# Patient Record
Sex: FEMALE | Race: WHITE | NOT HISPANIC OR LATINO | Employment: UNEMPLOYED | URBAN - NONMETROPOLITAN AREA
[De-identification: names, ages, dates, MRNs, and addresses within clinical notes are randomized per-mention and may not be internally consistent; named-entity substitution may affect disease eponyms.]

---

## 2022-06-01 ENCOUNTER — LAB (OUTPATIENT)
Dept: LAB | Facility: HOSPITAL | Age: 22
End: 2022-06-01

## 2022-06-01 ENCOUNTER — OFFICE VISIT (OUTPATIENT)
Dept: OBSTETRICS AND GYNECOLOGY | Facility: CLINIC | Age: 22
End: 2022-06-01

## 2022-06-01 VITALS
SYSTOLIC BLOOD PRESSURE: 110 MMHG | WEIGHT: 147 LBS | HEIGHT: 63 IN | DIASTOLIC BLOOD PRESSURE: 62 MMHG | BODY MASS INDEX: 26.05 KG/M2

## 2022-06-01 DIAGNOSIS — Z11.3 SCREEN FOR STD (SEXUALLY TRANSMITTED DISEASE): ICD-10-CM

## 2022-06-01 DIAGNOSIS — N63.10 PAINFUL LUMPY RIGHT BREAST: ICD-10-CM

## 2022-06-01 DIAGNOSIS — Z32.01 POSITIVE PREGNANCY TEST: ICD-10-CM

## 2022-06-01 DIAGNOSIS — N64.4 PAIN OF RIGHT BREAST: ICD-10-CM

## 2022-06-01 DIAGNOSIS — N64.4 PAINFUL LUMPY RIGHT BREAST: ICD-10-CM

## 2022-06-01 DIAGNOSIS — R10.2 PELVIC PAIN: Primary | ICD-10-CM

## 2022-06-01 PROCEDURE — 81025 URINE PREGNANCY TEST: CPT | Performed by: NURSE PRACTITIONER

## 2022-06-01 PROCEDURE — 87661 TRICHOMONAS VAGINALIS AMPLIF: CPT | Performed by: NURSE PRACTITIONER

## 2022-06-01 PROCEDURE — 87591 N.GONORRHOEAE DNA AMP PROB: CPT | Performed by: NURSE PRACTITIONER

## 2022-06-01 PROCEDURE — 87491 CHLMYD TRACH DNA AMP PROBE: CPT | Performed by: NURSE PRACTITIONER

## 2022-06-01 PROCEDURE — 99203 OFFICE O/P NEW LOW 30 MIN: CPT | Performed by: NURSE PRACTITIONER

## 2022-06-01 NOTE — PROGRESS NOTES
Subjective   Saira Sterling is a 21 y.o. right breast issues, inconclusive pregnancy test, pelvic pain    LMP: 05/09/2022  Pap: WNL, 03/17/2022  BC: none    Pt presents with complaints of pain and lump of right breast.  Her breast pain first began around 3 weeks ago, it is worse in the morning.  This morning her and partner palpated a golf ball sized lump behind right nipple.  She denies any nipple or skin color changes.  She breast fed her daughter for a few months back in 2019.  She also reports recurrent pelvic pain which began a few months ago.  She had Kyleena IUD for around 3 years before having it removed in March.  The provider who removed Kyleena had difficulty removing Kyleena IUD and believed it may have been slightly embedded into her uterus.  The pelvic pain is described as throbbing and worse with position changes.  She also reports one positive home pregnancy test followed by a couple of negative pregnancy tests.  Her urine pregnancy test today was negative.      Breast Problem  This is a new problem. The current episode started 1 to 4 weeks ago. The problem occurs daily. The problem has been gradually worsening. Pertinent negatives include no abdominal pain, anorexia, arthralgias, change in bowel habit, chest pain, chills, congestion, coughing, diaphoresis, fatigue, fever, headaches, joint swelling, myalgias, nausea, neck pain, numbness, rash, sore throat, swollen glands, urinary symptoms, vertigo, visual change, vomiting or weakness. Nothing aggravates the symptoms. She has tried nothing for the symptoms.   Pelvic Pain  The patient's primary symptoms include pelvic pain. The patient's pertinent negatives include no genital itching, genital lesions, genital odor, genital rash, missed menses, vaginal bleeding or vaginal discharge. Pertinent negatives include no abdominal pain, anorexia, chills, constipation, diarrhea, dysuria, fever, flank pain, frequency, headaches, hematuria, nausea, rash, sore  throat, urgency or vomiting. The symptoms are aggravated by activity. She has tried nothing for the symptoms. She is sexually active. No, her partner does not have an STD. She uses nothing for contraception. Her past medical history is significant for an STD.       The following portions of the patient's history were reviewed and updated as appropriate: allergies, current medications, past family history, past medical history, past social history, past surgical history and problem list.    Review of Systems   Constitutional: Negative for chills, diaphoresis, fatigue, fever and unexpected weight change.   HENT: Negative for congestion and sore throat.    Respiratory: Negative for apnea, cough, chest tightness and shortness of breath.    Cardiovascular: Negative for chest pain and palpitations.   Gastrointestinal: Negative for abdominal distention, abdominal pain, anorexia, change in bowel habit, constipation, diarrhea, nausea and vomiting.   Genitourinary: Positive for pelvic pain. Negative for decreased urine volume, difficulty urinating, dysuria, enuresis, flank pain, frequency, genital sores, hematuria, missed menses, urgency, vaginal bleeding, vaginal discharge and vaginal pain.   Musculoskeletal: Negative for arthralgias, joint swelling, myalgias and neck pain.   Skin: Negative for rash.   Neurological: Negative for vertigo, weakness, numbness and headaches.   Psychiatric/Behavioral: Negative for sleep disturbance and suicidal ideas.         Objective   Physical Exam  Vitals and nursing note reviewed. Exam conducted with a chaperone present.   Constitutional:       General: She is awake. She is not in acute distress.     Appearance: Normal appearance. She is well-developed and well-groomed. She is not ill-appearing, toxic-appearing or diaphoretic.   Neck:      Thyroid: No thyroid mass, thyromegaly or thyroid tenderness.   Cardiovascular:      Rate and Rhythm: Normal rate and regular rhythm.      Heart sounds:  Normal heart sounds.   Pulmonary:      Effort: Pulmonary effort is normal.      Breath sounds: Normal breath sounds.   Chest:   Breasts:      Guy Score is 5. Breasts are symmetrical.      Right: Tenderness present. No swelling, bleeding, inverted nipple, mass, nipple discharge, skin change, axillary adenopathy or supraclavicular adenopathy.      Left: Normal. No swelling, bleeding, inverted nipple, mass, nipple discharge, skin change, tenderness, axillary adenopathy or supraclavicular adenopathy.       Abdominal:      General: Bowel sounds are normal. There is no distension.      Palpations: Abdomen is soft.      Tenderness: There is no abdominal tenderness.   Genitourinary:     General: Normal vulva.      Exam position: Lithotomy position.      Guy stage (genital): 5.      Labia:         Right: No rash, tenderness, lesion or injury.         Left: No rash, tenderness, lesion or injury.       Urethra: No prolapse, urethral pain, urethral swelling or urethral lesion.      Vagina: Normal.      Cervix: Cervical motion tenderness present.      Uterus: Tender.       Adnexa:         Right: Tenderness present. No mass or fullness.          Left: Tenderness present. No mass or fullness.     Lymphadenopathy:      Upper Body:      Right upper body: No supraclavicular, axillary or pectoral adenopathy.      Left upper body: No supraclavicular, axillary or pectoral adenopathy.      Lower Body: No right inguinal adenopathy. No left inguinal adenopathy.   Skin:     General: Skin is warm and dry.   Neurological:      Mental Status: She is alert and oriented to person, place, and time.      Gait: Gait is intact.   Psychiatric:         Attention and Perception: Attention and perception normal.         Mood and Affect: Mood and affect normal.         Speech: Speech normal.         Behavior: Behavior normal. Behavior is cooperative.           Assessment & Plan   Diagnoses and all orders for this visit:    1. Pelvic pain  (Primary)  -     US Non-ob Transvaginal; Future    2. Pain of right breast    3. Painful lumpy right breast  -     US Breast Right Limited; Future    4. Screen for STD (sexually transmitted disease)  -     Chlamydia trachomatis, Neisseria gonorrhoeae, Trichomonas vaginalis, PCR - Urine, Urine, Clean Catch    5. Positive pregnancy test  -     POC Pregnancy, Urine    Bilateral breast tissue dense and lumpy and especially right breast.  Discussed lifestyle modifications to help with breast pain.  Recommended STI screening due to +CMT and pelvic tenderness.  TVUS to r/o anatomical abnormality.  Will call with results when they are available.  RTC if symptoms worsen or fail to improve.

## 2022-06-02 LAB
B-HCG UR QL: NEGATIVE
C TRACH RRNA CVX QL NAA+PROBE: NEGATIVE
EXPIRATION DATE: NORMAL
INTERNAL NEGATIVE CONTROL: NEGATIVE
INTERNAL POSITIVE CONTROL: POSITIVE
Lab: NORMAL
N GONORRHOEA RRNA SPEC QL NAA+PROBE: NEGATIVE
TRICHOMONAS VAGINALIS PCR: NEGATIVE

## 2022-06-20 DIAGNOSIS — N83.201 CYST OF RIGHT OVARY: Primary | ICD-10-CM

## 2022-09-13 ENCOUNTER — TELEPHONE (OUTPATIENT)
Dept: OBSTETRICS AND GYNECOLOGY | Facility: CLINIC | Age: 22
End: 2022-09-13

## 2023-06-08 DIAGNOSIS — Z32.01 POSITIVE URINE PREGNANCY TEST: Primary | ICD-10-CM

## 2023-06-12 ENCOUNTER — LAB (OUTPATIENT)
Dept: LAB | Facility: HOSPITAL | Age: 23
End: 2023-06-12
Payer: COMMERCIAL

## 2023-06-12 DIAGNOSIS — Z32.01 POSITIVE URINE PREGNANCY TEST: ICD-10-CM

## 2023-06-12 LAB — HCG INTACT+B SERPL-ACNC: 303 MIU/ML

## 2023-06-12 PROCEDURE — 36415 COLL VENOUS BLD VENIPUNCTURE: CPT

## 2023-06-12 PROCEDURE — 84702 CHORIONIC GONADOTROPIN TEST: CPT

## 2023-06-13 DIAGNOSIS — Z32.01 POSITIVE PREGNANCY TEST: Primary | ICD-10-CM

## 2023-06-18 ENCOUNTER — HOSPITAL ENCOUNTER (EMERGENCY)
Facility: HOSPITAL | Age: 23
Discharge: HOME | End: 2023-06-18
Admitting: FAMILY MEDICINE
Payer: COMMERCIAL

## 2023-06-18 ENCOUNTER — HOSPITAL ENCOUNTER (EMERGENCY)
Facility: HOSPITAL | Age: 23
Discharge: HOME | End: 2023-06-18
Admitting: EMERGENCY MEDICINE
Payer: COMMERCIAL

## 2023-06-18 ENCOUNTER — APPOINTMENT (OUTPATIENT)
Dept: ULTRASOUND IMAGING | Facility: HOSPITAL | Age: 23
End: 2023-06-18
Payer: COMMERCIAL

## 2023-06-18 VITALS
TEMPERATURE: 97.5 F | DIASTOLIC BLOOD PRESSURE: 73 MMHG | WEIGHT: 153 LBS | RESPIRATION RATE: 20 BRPM | OXYGEN SATURATION: 98 % | HEART RATE: 110 BPM | BODY MASS INDEX: 27.11 KG/M2 | SYSTOLIC BLOOD PRESSURE: 122 MMHG | HEIGHT: 63 IN

## 2023-06-18 VITALS
WEIGHT: 153 LBS | HEIGHT: 63 IN | TEMPERATURE: 98.5 F | SYSTOLIC BLOOD PRESSURE: 130 MMHG | BODY MASS INDEX: 27.11 KG/M2 | OXYGEN SATURATION: 99 % | RESPIRATION RATE: 20 BRPM | DIASTOLIC BLOOD PRESSURE: 78 MMHG | HEART RATE: 117 BPM

## 2023-06-18 DIAGNOSIS — Z3A.01 LESS THAN 8 WEEKS GESTATION OF PREGNANCY: ICD-10-CM

## 2023-06-18 DIAGNOSIS — N93.9 VAGINAL BLEEDING: Primary | ICD-10-CM

## 2023-06-18 DIAGNOSIS — N30.01 ACUTE CYSTITIS WITH HEMATURIA: ICD-10-CM

## 2023-06-18 DIAGNOSIS — O20.9 BLEEDING IN EARLY PREGNANCY: ICD-10-CM

## 2023-06-18 LAB
ABO GROUP BLD: NORMAL
B-HCG UR QL: POSITIVE
BACTERIA UR QL AUTO: ABNORMAL /HPF
BILIRUB UR QL STRIP: NEGATIVE
CLARITY UR: ABNORMAL
COLOR UR: ABNORMAL
GLUCOSE UR STRIP-MCNC: NEGATIVE MG/DL
HCG INTACT+B SERPL-ACNC: 399.8 MIU/ML
HGB UR QL STRIP.AUTO: ABNORMAL
HYALINE CASTS UR QL AUTO: ABNORMAL /LPF
KETONES UR QL STRIP: ABNORMAL
LEUKOCYTE ESTERASE UR QL STRIP.AUTO: ABNORMAL
NITRITE UR QL STRIP: NEGATIVE
PH UR STRIP.AUTO: 5.5 [PH] (ref 5–9)
PROT UR QL STRIP: ABNORMAL
RBC # UR STRIP: ABNORMAL /HPF
REF LAB TEST METHOD: ABNORMAL
RH BLD: POSITIVE
SP GR UR STRIP: 1.02 (ref 1–1.03)
SQUAMOUS #/AREA URNS HPF: ABNORMAL /HPF
UROBILINOGEN UR QL STRIP: ABNORMAL
WBC # UR STRIP: ABNORMAL /HPF

## 2023-06-18 PROCEDURE — 86901 BLOOD TYPING SEROLOGIC RH(D): CPT | Performed by: STUDENT IN AN ORGANIZED HEALTH CARE EDUCATION/TRAINING PROGRAM

## 2023-06-18 PROCEDURE — 99282 EMERGENCY DEPT VISIT SF MDM: CPT

## 2023-06-18 PROCEDURE — 76817 TRANSVAGINAL US OBSTETRIC: CPT

## 2023-06-18 PROCEDURE — 81001 URINALYSIS AUTO W/SCOPE: CPT | Performed by: STUDENT IN AN ORGANIZED HEALTH CARE EDUCATION/TRAINING PROGRAM

## 2023-06-18 PROCEDURE — 86900 BLOOD TYPING SEROLOGIC ABO: CPT | Performed by: STUDENT IN AN ORGANIZED HEALTH CARE EDUCATION/TRAINING PROGRAM

## 2023-06-18 PROCEDURE — 36415 COLL VENOUS BLD VENIPUNCTURE: CPT | Performed by: STUDENT IN AN ORGANIZED HEALTH CARE EDUCATION/TRAINING PROGRAM

## 2023-06-18 PROCEDURE — 84702 CHORIONIC GONADOTROPIN TEST: CPT | Performed by: STUDENT IN AN ORGANIZED HEALTH CARE EDUCATION/TRAINING PROGRAM

## 2023-06-18 PROCEDURE — 36415 COLL VENOUS BLD VENIPUNCTURE: CPT

## 2023-06-18 PROCEDURE — 81025 URINE PREGNANCY TEST: CPT | Performed by: STUDENT IN AN ORGANIZED HEALTH CARE EDUCATION/TRAINING PROGRAM

## 2023-06-18 PROCEDURE — 99283 EMERGENCY DEPT VISIT LOW MDM: CPT

## 2023-06-18 RX ORDER — CEPHALEXIN 500 MG/1
500 CAPSULE ORAL 3 TIMES DAILY
Qty: 21 CAPSULE | Refills: 0 | Status: SHIPPED | OUTPATIENT
Start: 2023-06-18 | End: 2023-06-25

## 2023-06-18 NOTE — ED PROVIDER NOTES
Subjective   History of Present Illness    22 yr old female who is around 5 weeks pregnant presented to ED for the second time today due to worsening vaginal bleeding. Patient reports after leaving ED earlier she now noticed some clots and wanted to get it checked. She felt little lightheaded after seeing clot and was stressed out as she had previous miscarriages.     Review of Systems   Constitutional:  Negative for activity change, appetite change, fatigue and fever.   Genitourinary:  Positive for vaginal bleeding. Negative for difficulty urinating.   Skin:  Negative for color change and rash.   Neurological:  Positive for light-headedness. Negative for dizziness and weakness.     History reviewed. No pertinent past medical history.    Allergies   Allergen Reactions    Latex Rash       History reviewed. No pertinent surgical history.    History reviewed. No pertinent family history.    Social History     Socioeconomic History    Marital status: Single   Tobacco Use    Smoking status: Every Day   Substance and Sexual Activity    Alcohol use: Yes     Alcohol/week: 28.0 standard drinks     Types: 28 Cans of beer per week    Drug use: Yes     Types: Marijuana    Sexual activity: Yes     Partners: Male     Birth control/protection: None           Objective   Physical Exam  Vitals and nursing note reviewed.   Constitutional:       General: She is not in acute distress.     Appearance: Normal appearance. She is not ill-appearing, toxic-appearing or diaphoretic.   Cardiovascular:      Rate and Rhythm: Normal rate and regular rhythm.      Pulses: Normal pulses.      Heart sounds: Normal heart sounds. No murmur heard.  Pulmonary:      Effort: Pulmonary effort is normal. No respiratory distress.      Breath sounds: Normal breath sounds. No wheezing.   Musculoskeletal:         General: Normal range of motion.   Neurological:      Mental Status: She is alert.       Procedures           ED Course                                            Medical Decision Making  Patient presented to ED again after she noticed some persistent vaginal bleeding with clot. Spoke with oncall Obgyn Dr Guallpa and discussed the ultrasound results, who recommended out patient follow up. The pregnancy is still in early stages and it is difficult to visualize fully in ultrasound. Patient was advised to call obgyn office on Monday and have close follow up. Unfortunately there is no acute intervention that can be done in the ED if it is a inevitable miscarriage. Patient was assured and was agreeable for discharge plan. Strongly advised to return to ED if she starts having lightheadedness, syncope or excessive amount of vaginal bleeding.     Problems Addressed:  Less than 8 weeks gestation of pregnancy: acute illness or injury  Vaginal bleeding: acute illness or injury        Final diagnoses:   Vaginal bleeding   Less than 8 weeks gestation of pregnancy       ED Disposition  ED Disposition       ED Disposition   Discharge    Condition   Stable    Comment   --               Sarah Guallpa, [address]  [facility] [address] [place] [id number]  [phone]    In 1 day           Medication List      No changes were made to your prescriptions during this visit.           Madelin Segundo MD PGY-3  [facility]   This document has been electronically signed by Madelin Segundo MD on June 18, 2023 20:47 CDT         Madelin Segundo MD  Resident  06/18/23 2052

## 2023-06-18 NOTE — ED NOTES
Patient presents today with vaginal bleeding and lower abdominal pain. She states that the pain and bleeding started this morning.

## 2023-06-18 NOTE — ED PROVIDER NOTES
Subjective   History of Present Illness    22 yr old female presents today due to an episode of vaginal bleeding this morning. Patient was recently found to be pregnant, roughly around 5 weeks. The bleeding is not to the point she needs to wear a pad. Patient had a brief episode of nausea which is resolved. Since yesterday was feeling little tired. Patient has known history of ovarian cyst. Denies any fevers or chills. Did have some episode of dysuria.   Patient has prior history of still born and miscarriages.     Review of Systems   Constitutional:  Positive for activity change and fatigue. Negative for appetite change and fever.   Gastrointestinal:  Positive for abdominal pain (suprapubic pain) and nausea. Negative for abdominal distention, blood in stool, constipation, diarrhea and vomiting.   Genitourinary:  Positive for vaginal bleeding. Negative for difficulty urinating, vaginal discharge and vaginal pain.     History reviewed. No pertinent past medical history.    Allergies   Allergen Reactions    Latex Rash       History reviewed. No pertinent surgical history.    History reviewed. No pertinent family history.    Social History     Socioeconomic History    Marital status: Single   Tobacco Use    Smoking status: Every Day   Substance and Sexual Activity    Alcohol use: Yes     Alcohol/week: 28.0 standard drinks     Types: 28 Cans of beer per week    Drug use: Yes     Types: Marijuana    Sexual activity: Yes     Partners: Male     Birth control/protection: None           Objective   Physical Exam  Vitals and nursing note reviewed.   Constitutional:       General: She is not in acute distress.     Appearance: Normal appearance. She is not ill-appearing, toxic-appearing or diaphoretic.   Cardiovascular:      Rate and Rhythm: Normal rate and regular rhythm.      Pulses: Normal pulses.      Heart sounds: Normal heart sounds. No murmur heard.  Pulmonary:      Effort: Pulmonary effort is normal. No respiratory  distress.      Breath sounds: Normal breath sounds. No wheezing.   Abdominal:      General: Bowel sounds are normal.      Palpations: Abdomen is soft.      Tenderness: There is abdominal tenderness (suprapubic region). There is no right CVA tenderness or left CVA tenderness.   Musculoskeletal:      Right lower leg: No edema.      Left lower leg: No edema.   Neurological:      Mental Status: She is alert.       Procedures           ED Course  ED Course as of 06/18/23 1206   Sun Jun 18, 2023   1055 Patient used bathroom to give urine sample, noted some small clots and bloody urine.  [LN]   1125 HCG Quantitative: 399.80 [LN]   1159 Called lab to ensure urine is sent for culture [LN]      ED Course User Index  [LN] Madelin Segundo MD                                           Medical Decision Making  Patient presented to ED with stable vitals. Urine preg was positive. Quantitative hcg level elevated compared to prior read. Transvaginal ultrasound was done which reported the pregnancy is early gestation stage. UA had evidence of luek with some bacteria, some squamous cells. Urine was sent for culture. Since pt has symptoms of dysuria and suprapubic pain in the setting of early pregnancy, will treat with oral antibiotics which was sent to her pharmacy. Patient was encouraged good oral hydration and call ob office on Monday morning. Return to ED precuations were given. Patient was agreeable with the discharge plan.     Problems Addressed:  Acute cystitis with hematuria: complicated acute illness or injury  Bleeding in early pregnancy: complicated acute illness or injury  Vaginal bleeding: complicated acute illness or injury    Amount and/or Complexity of Data Reviewed  Labs: ordered. Decision-making details documented in ED Course.  Radiology: ordered.    Risk  Prescription drug management.        Final diagnoses:   Vaginal bleeding   Bleeding in early pregnancy   Acute cystitis with hematuria       ED Disposition  ED  Disposition       ED Disposition   Discharge    Condition   Stable    Comment   --               [address]  [facility] [address] [facility] [id number] [phone]  Schedule an appointment as soon as possible for a visit in 1 day           Medication List        New Prescriptions      cephalexin 500 MG capsule  Commonly known as: KEFLEX  Take 1 capsule by mouth 3 (Three) Times a Day for 7 days.               Where to Get Your Medications        These medications were sent to [facility]/pharmacy #[id number] - [place], [place] - [address] [place] [address] - [phone] [facility] [phone] [address] [address]      Phone: [phone]   cephalexin 500 MG capsule               Madelin Segundo MD PGY-3  [facility]   This document has been electronically signed by Madelin Segundo MD on June 18, 2023 12:06 CDT         Madelin Segundo MD  Resident  06/18/23 1212

## 2023-06-18 NOTE — DISCHARGE INSTRUCTIONS
Please finish full course of oral antibiotics and ensure you are drinking enough water. Please call your ob office on Monday morning for follow up appointment. Return to ED if symptoms worse.   
no

## 2023-06-22 LAB — HCG INTACT+B SERPL-ACNC: 46.54 MIU/ML
